# Patient Record
Sex: MALE | Race: WHITE | NOT HISPANIC OR LATINO | Employment: FULL TIME | URBAN - METROPOLITAN AREA
[De-identification: names, ages, dates, MRNs, and addresses within clinical notes are randomized per-mention and may not be internally consistent; named-entity substitution may affect disease eponyms.]

---

## 2022-10-11 ENCOUNTER — OFFICE VISIT (OUTPATIENT)
Dept: URGENT CARE | Facility: CLINIC | Age: 42
End: 2022-10-11
Payer: COMMERCIAL

## 2022-10-11 VITALS
HEIGHT: 69 IN | SYSTOLIC BLOOD PRESSURE: 120 MMHG | RESPIRATION RATE: 16 BRPM | OXYGEN SATURATION: 95 % | WEIGHT: 160 LBS | TEMPERATURE: 97.5 F | HEART RATE: 73 BPM | DIASTOLIC BLOOD PRESSURE: 80 MMHG | BODY MASS INDEX: 23.7 KG/M2

## 2022-10-11 DIAGNOSIS — S61.213A LACERATION OF LEFT MIDDLE FINGER WITHOUT FOREIGN BODY WITHOUT DAMAGE TO NAIL, INITIAL ENCOUNTER: ICD-10-CM

## 2022-10-11 PROCEDURE — 12001 RPR S/N/AX/GEN/TRNK 2.5CM/<: CPT | Performed by: PHYSICIAN ASSISTANT

## 2022-10-11 ASSESSMENT — ENCOUNTER SYMPTOMS
MYALGIAS: 0
BRUISES/BLEEDS EASILY: 0
TINGLING: 0
WEAKNESS: 0
ROS SKIN COMMENTS: LEFT MIDDLE FINGER LACERATION

## 2022-10-12 NOTE — PROGRESS NOTES
"Subjective:     CHIEF COMPLAINT  Chief Complaint   Patient presents with    Laceration     Left middle finger cut on plate         HPI  Israel Roy is a very pleasant 42 y.o. male who presents to the clinic after sustaining a left middle finger laceration earlier this afternoon.  Patient dropped a plate and cut himself on a piece of ceramic.  Small laceration over the palmar aspect of his left middle finger middle phalanx.  Bleeding controlled with compression.  No foreign bodies.  Denies any loss of sensation distally.  No difficulty with range of motion of the digit.  Tetanus is up-to-date.    REVIEW OF SYSTEMS  Review of Systems   Musculoskeletal:  Negative for joint pain and myalgias.   Skin:         Left middle finger laceration   Neurological:  Negative for tingling and weakness.   Endo/Heme/Allergies:  Does not bruise/bleed easily.     PAST MEDICAL HISTORY  There are no problems to display for this patient.      SURGICAL HISTORY  patient denies any surgical history    ALLERGIES  No Known Allergies    CURRENT MEDICATIONS  Home Medications       Reviewed by Hubert Taylor P.A.-C. (Physician Assistant) on 10/11/22 at 7884  Med List Status: <None>     Medication Last Dose Status        Patient Mahamed Taking any Medications                           SOCIAL HISTORY  Social History     Tobacco Use    Smoking status: Never    Smokeless tobacco: Never   Substance and Sexual Activity    Alcohol use: Not on file    Drug use: Not on file    Sexual activity: Not on file       FAMILY HISTORY  History reviewed. No pertinent family history.       Objective:     VITAL SIGNS: /80   Pulse 73   Temp 36.4 °C (97.5 °F) (Temporal)   Resp 16   Ht 1.753 m (5' 9\")   Wt 72.6 kg (160 lb)   SpO2 95%   BMI 23.63 kg/m²     PHYSICAL EXAM  Physical Exam  Constitutional:       Appearance: Normal appearance.   HENT:      Head: Normocephalic and atraumatic.   Eyes:      Conjunctiva/sclera: Conjunctivae normal.   Pulmonary:      " Effort: Pulmonary effort is normal.   Musculoskeletal:         General: Normal range of motion.      Cervical back: Normal range of motion.      Comments: 1.5 cm linear laceration over the left third digit palmar aspect middle phalanx.  No underlying foreign bodies present.  Bleeding controlled.  No neurovascular damage.  Sensation intact distally.  Full and pain-free flexion and extension of the MCP PIP and DIP joint.   Skin:     Capillary Refill: Capillary refill takes less than 2 seconds.   Neurological:      General: No focal deficit present.      Mental Status: He is alert and oriented to person, place, and time. Mental status is at baseline.       Assessment/Plan:     1. Laceration of left middle finger without foreign body without damage to nail, initial encounter      MDM/Comments:    Procedure: Laceration Repair  -Risks including bleeding, nerve damage, infection, and poor cosmetic outcome discussed. Benefits and alternatives discussed.   -Clean technique with sterile instruments and suture used  -Local anesthesia with 2cc of 1% lidocaine  -Closed with #3  5-0 Ethilon interrupted sutures with good wound approximation  -Polysporin and dressing placed.  Coban placed to keep finger in full extension.  -Patient tolerated well  -Follow-up in clinic in 10-14 days for suture removal.  -Tetanus up-to-date.  -Strict return precautions for any signs of infection.      Differential diagnosis, natural history, supportive care, and indications for immediate follow-up discussed. All questions answered. Patient agrees with the plan of care.    Follow-up as needed if symptoms worsen or fail to improve to PCP, Urgent care or Emergency Room.    I have personally reviewed prior external notes and test results pertinent to today's visit.  I have independently reviewed and interpreted all diagnostics ordered during this urgent care acute visit.   Discussed management options (risks,benefits, and alternatives to treatment). Pt  expresses understanding and the treatment plan was agreed upon. Questions were encouraged and answered to pt's satisfaction.    Please note that this dictation was created using voice recognition software. I have made a reasonable attempt to correct obvious errors, but I expect that there are errors of grammar and possibly content that I did not discover before finalizing the note.

## 2023-12-22 ENCOUNTER — OFFICE VISIT (OUTPATIENT)
Dept: URGENT CARE | Facility: CLINIC | Age: 43
End: 2023-12-22
Payer: COMMERCIAL

## 2023-12-22 VITALS
DIASTOLIC BLOOD PRESSURE: 70 MMHG | BODY MASS INDEX: 24.44 KG/M2 | TEMPERATURE: 98.3 F | SYSTOLIC BLOOD PRESSURE: 102 MMHG | WEIGHT: 161.3 LBS | HEART RATE: 82 BPM | RESPIRATION RATE: 16 BRPM | HEIGHT: 68 IN | OXYGEN SATURATION: 97 %

## 2023-12-22 DIAGNOSIS — H66.011 ACUTE SUPPURATIVE OTITIS MEDIA OF RIGHT EAR WITH SPONTANEOUS RUPTURE OF TYMPANIC MEMBRANE, RECURRENCE NOT SPECIFIED: ICD-10-CM

## 2023-12-22 PROCEDURE — 3078F DIAST BP <80 MM HG: CPT | Performed by: PHYSICIAN ASSISTANT

## 2023-12-22 PROCEDURE — 3074F SYST BP LT 130 MM HG: CPT | Performed by: PHYSICIAN ASSISTANT

## 2023-12-22 PROCEDURE — 99204 OFFICE O/P NEW MOD 45 MIN: CPT | Performed by: PHYSICIAN ASSISTANT

## 2023-12-22 RX ORDER — OFLOXACIN 3 MG/ML
5 SOLUTION AURICULAR (OTIC) DAILY
Qty: 7 ML | Refills: 0 | Status: SHIPPED | OUTPATIENT
Start: 2023-12-22 | End: 2023-12-29

## 2023-12-22 RX ORDER — AMOXICILLIN AND CLAVULANATE POTASSIUM 875; 125 MG/1; MG/1
1 TABLET, FILM COATED ORAL 2 TIMES DAILY
Qty: 20 TABLET | Refills: 0 | Status: SHIPPED | OUTPATIENT
Start: 2023-12-22 | End: 2024-01-01

## 2023-12-22 ASSESSMENT — ENCOUNTER SYMPTOMS
VOMITING: 0
DIZZINESS: 0
HEADACHES: 0
NAUSEA: 0
CHILLS: 0
FEVER: 0

## 2023-12-22 NOTE — PROGRESS NOTES
"Subjective:   Israel Roy is a 43 y.o. male who presents for Ear Drainage (Per the patient, \"I had lost some hearing in my right ear and had an ear infection around Thanksgiving and was given a steroid ear drop. Last night fluid started to leak out of the right ear. Hearing has not returned. The bone behind my right ear is  to touch. /I had lost some hearing in my right ear and had an ear infection around thanksgiving. I was given a steroid ear drop. /Last night fluid started to leak out of my Right ear. Hearing has not returned. The bone behind my right ear is  )        Patient presents with concerns of waxing and waning ear pain since from Thanksgiving.  States that he initially saw his PCP and was diagnosed with otitis externa.  He was started on Cortisporin drops.  He felt like this was improving his symptoms and use this through Tuesday.  He ran out of the drops and has not used them since Tuesday.  Over the past few days he felt his ear pain began to worsen and this morning he noticed an acute decrease in his hearing as well as yellow discharge coming from the right ear.  No nausea, vomiting, fevers, chills.      Review of Systems   Constitutional:  Negative for chills and fever.   Gastrointestinal:  Negative for nausea and vomiting.   Neurological:  Negative for dizziness and headaches.       PMH:  has no past medical history on file.  MEDS:   Current Outpatient Medications:     amoxicillin-clavulanate (AUGMENTIN) 875-125 MG Tab, Take 1 Tablet by mouth 2 times a day for 10 days., Disp: 20 Tablet, Rfl: 0    ofloxacin otic sol (FLOXIN OTIC) 0.3 % Solution, Administer 5 Drops into affected ear(s) every day for 7 days., Disp: 7 mL, Rfl: 0  ALLERGIES: No Known Allergies  SURGHX: No past surgical history on file.  SOCHX:  reports that he has never smoked. He has never used smokeless tobacco. He reports current alcohol use. He reports that he does not use drugs.  FH: Family history was reviewed, no " "pertinent findings to report   Objective:   /70 (BP Location: Left arm, Patient Position: Sitting, BP Cuff Size: Adult)   Pulse 82   Temp 36.8 °C (98.3 °F) (Temporal)   Resp 16   Ht 1.727 m (5' 8\")   Wt 73.2 kg (161 lb 4.8 oz)   SpO2 97%   BMI 24.53 kg/m²   Physical Exam  Vitals reviewed.   Constitutional:       General: He is not in acute distress.     Appearance: Normal appearance. He is well-developed. He is not toxic-appearing.   HENT:      Head: Normocephalic and atraumatic.      Right Ear: External ear normal.      Left Ear: External ear normal.      Ears:      Comments: Right tympanic membrane is thickened and opaque with a small perforation at approximately the 6 o'clock position.  There is copious yellow discharge coming from the perforation and in the inferior aspect of the EAC.  EAC is nonedematous and nonerythematous.  No pain with otic exam and with manipulation of the tragus and auricle.  Patient has mild posterior auricular lymphadenopathy.  Mastoid processes nontender and there is no overlying erythema or edema.     Nose: Nose normal.   Cardiovascular:      Rate and Rhythm: Normal rate and regular rhythm.   Pulmonary:      Effort: Pulmonary effort is normal. No respiratory distress.      Breath sounds: No stridor.   Skin:     General: Skin is dry.   Neurological:      Comments: Alert and oriented.    Psychiatric:         Speech: Speech normal.         Behavior: Behavior normal.           Assessment/Plan:   1. Acute suppurative otitis media of right ear with spontaneous rupture of tympanic membrane, recurrence not specified  - amoxicillin-clavulanate (AUGMENTIN) 875-125 MG Tab; Take 1 Tablet by mouth 2 times a day for 10 days.  Dispense: 20 Tablet; Refill: 0  - ofloxacin otic sol (FLOXIN OTIC) 0.3 % Solution; Administer 5 Drops into affected ear(s) every day for 7 days.  Dispense: 7 mL; Refill: 0    Exam today consistent with right-sided AOM with perforation.  This does not look like " mastoiditis, but consideration discussed with patient.  Patient started on Augmentin and ofloxacin drops.  Recommend follow-up with PCP in 7 to 10 days to ensure perforation heals and hearing normalizes.  Additionally I would like patient to be immediately reevaluated with any new or worsening symptoms.  We also reviewed red flag signs and symptoms and patient was given ED precautions.